# Patient Record
(demographics unavailable — no encounter records)

---

## 2024-10-29 NOTE — DISCUSSION/SUMMARY
[FreeTextEntry1] : 76 YO F s/p R PE & embolectomy (10/14/2024) with RLE DVT on Eliquis 5 mg BID who has multiple medical conditions and is obese with a sedentary lifestyle. Pt has cough with wheezing which is worse at night and uses albuterol inhaler daily. Pt will do a trial of Trelegy and FU in 1 month.  Attending  Patient admitted to the hospital for chest pain and shortness of breath and found to have pulmonary emboli.  Likely secondary to patient's morbid obesity and inactivity.  Since this is likely to be lifelong I recommend lifelong anticoagulation.  Patient has a cough with wheezing and she uses albuterol every night.  She can also feel symptoms during the day.  I favor trial of Trelegy 100 mg 1 spray daily.  We will see the patient in 1 month with pulmonary function test. The patient understands and agrees with plan of care. Today's office visit encompassed 62 minutes. I conducted an extensive history,physical exam and reviewed diagnosis and treatment options including diagnostic tests,radiology studies including cat scans and the use of prescription medication.

## 2024-10-29 NOTE — PHYSICAL EXAM
[No Acute Distress] : no acute distress [Normal Oropharynx] : normal oropharynx [Normal Appearance] : normal appearance [No Neck Mass] : no neck mass [Normal Rate/Rhythm] : normal rate/rhythm [Normal S1, S2] : normal s1, s2 [No Murmurs] : no murmurs [No Resp Distress] : no resp distress [Clear to Auscultation Bilaterally] : clear to auscultation bilaterally [No Abnormalities] : no abnormalities [Benign] : benign [No Clubbing] : no clubbing [No Cyanosis] : no cyanosis [FROM] : FROM [1+ Pitting] : 1+ pitting [Normal Color/ Pigmentation] : normal color/ pigmentation [No Focal Deficits] : no focal deficits [Oriented x3] : oriented x3 [Normal Affect] : normal affect [TextBox_99] : uses sit/walker, +kyphosis

## 2024-10-29 NOTE — HISTORY OF PRESENT ILLNESS
[Never] : never [TextBox_4] : 76 yo F no documented Pulmonary conditions but does have wheezing so has used albuterol inhaler prn through the years  Has lived in [place] for 13 years and most recent with daughter in [place]. Lives with a dog & bird. No Hx of sleep apnea but snores & does have day somnolence.  Has cough at night and doesn't have cough meds. Uses albuterol if feels SOB prior to bed. Last used last night.  Pt was admitted to [facility] from 10/12/2024 - 10/22/2024 fpor PE & DVT.  CTA showed Massive PE on R side with right heart strain.    S/p IR-guided PE thrombectomy 10/14 Pt saw Dr. Westfall ([facility]) today and will continue with the [facility].  Sees Cardio, Endo & Nephro - seeing all this week.  Today feels tired, fatigued. Pt does regularly have assistance from family with ADLs, this has not changed.  Overall feels breathing better than before hospital .

## 2024-11-07 NOTE — HISTORY OF PRESENT ILLNESS
[FreeTextEntry1] : 76yo F here with daughter for left renal mass. History obtained from the daughter as she is Polish speaking only.  Patient was recently hospitalized for 2 months at [facility] for a massive PE and underwent thrombectomy on 10/14. She is on Eliquis now. Also has multiple other comorbid medical conditions including CKD for which she is on lasix 40mg daily. She reports that causes her to urinate frequently and has some stress incontinence that is bothersome. She is also very tired from seeing so many doctors, walks with a walker. Has a heart monitor on and going for a stress test soon.  While in the hospital she had contrast nephropathy. Most recent creatinine dennis was 1.2.  While she was at [facility] she had an ultrasound done that showed a 3.2cm left lower pole renal mass. She was unaware of this finding.   Left kidney: 12.1 cm.Increased renal cortical echogenicity. Irregularity of the cortex in the left midpolar kidney. There is a solid, hypoechoic mass in the left kidney lower pole with vascularity measuring 3.2 x 2.7 x 2.9cm. It measured 2.9 cm x 3.1 cm x 2.6 cm on prior CT 11/1/2023.  Prior CT scan from 11/2023 showed similar size and appearance of the left renal mass.   Denies family history of  malignancies.  No smoking history.  Prior surgery: hysterectomy, thyroid surgery

## 2024-11-07 NOTE — ASSESSMENT
[FreeTextEntry1] : 76yo comorbid female with 3cm left renal mass, urinary incontinence  Left renal mass - stable in size from imaging 1 year ago but not ideal to compare ultrasound to CT scan. However given recent contrast nephropathy and CKD will refrain from giving IV iodinated contrast load - will obtain a MRI with and without gadolinium for better characterization of the mass - discussed that the mass is small in size and stable and can likely be followed with routine imaging vs other interventions. Will further discuss at next appointment  Urinary incontinence - discussed options for UI are limited given her need for the furosemide and her weight  Follow-up appointment in 1 month after MRI for continued management of left renal mass

## 2024-11-07 NOTE — PHYSICAL EXAM
[Normal Appearance] : normal appearance [Well Groomed] : well groomed [General Appearance - In No Acute Distress] : no acute distress [Respiration, Rhythm And Depth] : normal respiratory rhythm and effort [Exaggerated Use Of Accessory Muscles For Inspiration] : no accessory muscle use [Abdomen Soft] : soft [Abdomen Tenderness] : non-tender [Costovertebral Angle Tenderness] : no ~M costovertebral angle tenderness [Urinary Bladder Findings] : the bladder was normal on palpation [Normal Station and Gait] : the gait and station were normal for the patient's age [] : no rash [No Focal Deficits] : no focal deficits [Oriented To Time, Place, And Person] : oriented to person, place, and time [Mood] : the mood was normal [Affect] : the affect was normal [No Palpable Adenopathy] : no palpable adenopathy [FreeTextEntry1] : obesity [de-identified] : + significant lower extremity edema

## 2024-11-18 NOTE — PHYSICAL EXAM
[Walker] : ambulates with walker [de-identified] :  Left knee exam shows no effusion, ROM is 0-110 degrees, no instability, no pain with Niles, pain medial and lateral joint line tenderness. Right knee exam shows no effusion, ROM is 0-115 degrees, no instability, no pain with Niles, medial and lateral joint line tenderness. 5/5 motor strength in bilateral lower extremities. Sensory: Intact in bilateral lower extremities. DTRs: Biceps, brachioradialis, triceps, patellar, ankle and plantar 2+ and symmetric bilaterally. Pulses: dorsalis pedis, posterior tibial, femoral, popliteal, and radial 2+ and symmetric bilaterally.

## 2024-11-18 NOTE — PHYSICAL EXAM
[Walker] : ambulates with walker [de-identified] :  Left knee exam shows no effusion, ROM is 0-110 degrees, no instability, no pain with Niles, pain medial and lateral joint line tenderness. Right knee exam shows no effusion, ROM is 0-115 degrees, no instability, no pain with Niles, medial and lateral joint line tenderness. 5/5 motor strength in bilateral lower extremities. Sensory: Intact in bilateral lower extremities. DTRs: Biceps, brachioradialis, triceps, patellar, ankle and plantar 2+ and symmetric bilaterally. Pulses: dorsalis pedis, posterior tibial, femoral, popliteal, and radial 2+ and symmetric bilaterally.

## 2024-11-18 NOTE — HISTORY OF PRESENT ILLNESS
[de-identified] : 77 year old female presents to the office for follow up of bilateral knee pain, patient reports right worse than left. She received a cortisone injection at her last visit in August, which provided excellent relief of symptoms. States the pain has gradually begun to return.  Exacerbated by walking, standing, rising from a seated position. Ambulating with the use of a walker. States that her knee hilda often. Denies any falls/trauma, numbness/tingling. The patient states that she was hospitalized last month for a PE and is now on Eliquis.   Of note the patient is accompanied by her family member who is providing translation services.

## 2024-11-18 NOTE — HISTORY OF PRESENT ILLNESS
[de-identified] : 77 year old female presents to the office for follow up of bilateral knee pain, patient reports right worse than left. She received a cortisone injection at her last visit in August, which provided excellent relief of symptoms. States the pain has gradually begun to return.  Exacerbated by walking, standing, rising from a seated position. Ambulating with the use of a walker. States that her knee hilda often. Denies any falls/trauma, numbness/tingling. The patient states that she was hospitalized last month for a PE and is now on Eliquis.   Of note the patient is accompanied by her family member who is providing translation services.

## 2024-11-18 NOTE — DISCUSSION/SUMMARY
[Medication Risks Reviewed] : Medication risks reviewed [Surgical risks reviewed] : Surgical risks reviewed [de-identified] : 77-year-old female presents to the office for follow-up of severe bilateral knee osteoarthritis, reports right worse than left. The patient would like to defer any surgery in her knees for as long as possible. She is still awaiting a procedure for her back, this was delayed to a recent hospitalization for PE.  She would therefore like to continue with conservative treatment. I have given her injection of cortisone to bilateral knees, which she tolerated well. I recommend low impact active and exercise. I recommend physical therapy. She should take Tylenol as needed for pain as she is unable to take NSAIDs due to her use of Eliquis. She should follow-up in 3 months repeat evaluation. All questions addressed with the patient and her family member who provided translation, patient in agreement.

## 2024-11-18 NOTE — DISCUSSION/SUMMARY
[Medication Risks Reviewed] : Medication risks reviewed [Surgical risks reviewed] : Surgical risks reviewed [de-identified] : 77-year-old female presents to the office for follow-up of severe bilateral knee osteoarthritis, reports right worse than left. The patient would like to defer any surgery in her knees for as long as possible. She is still awaiting a procedure for her back, this was delayed to a recent hospitalization for PE.  She would therefore like to continue with conservative treatment. I have given her injection of cortisone to bilateral knees, which she tolerated well. I recommend low impact active and exercise. I recommend physical therapy. She should take Tylenol as needed for pain as she is unable to take NSAIDs due to her use of Eliquis. She should follow-up in 3 months repeat evaluation. All questions addressed with the patient and her family member who provided translation, patient in agreement.

## 2024-11-18 NOTE — REVIEW OF SYSTEMS
[Joint Pain] : joint pain [Joint Stiffness] : joint stiffness [Joint Swelling] : joint swelling [Lower Ext Edema] : lower extremity edema [SOB on Exertion] : shortness of breath on exertion [Negative] : Heme/Lymph [FreeTextEntry9] : bilateral knee

## 2024-12-02 NOTE — REASON FOR VISIT
[Asthma] : asthma [Pulmonary Embolism] : pulmonary embolism [Other: _____] : [unfilled] [TextEntry] : Patient has been feeling better.  She has been using the Trelegy.

## 2024-12-02 NOTE — HISTORY OF PRESENT ILLNESS
[Never] : never [TextBox_4] : 77 female hx PE/DVT last visit on trelegy 1 qd feels very good no sob no cough no wheeze no chest pain no tightness full activity  prior tika normal

## 2024-12-02 NOTE — DISCUSSION/SUMMARY
[FreeTextEntry1] : Ms. Velázquez has mild asthma.  She is doing well on the Trelegy.  We will continue same.  The patient had a prior pulmonary embolus deep venous thrombosis.  Likely secondary to obesity and sedentary lifestyle.  I recommend continuing lifetime anticoagulation patient and daughter agree. The patient understands and agrees with plan of care. Today's office visit encompassed 32 minutes which excludes teaching and separately reported services.. I conducted an extensive history, physical exam and reviewed diagnosis and treatment options including diagnostic tests,radiology studies including cat scans and the use of prescription medication.

## 2024-12-16 NOTE — PHYSICAL EXAM
[General Appearance - Alert] : alert [General Appearance - In No Acute Distress] : in no acute distress [Ankle Swelling (On Exam)] : present [Varicose Veins Of Lower Extremities] : present [1+] : left foot posterior tibialis 1+ [2+] : left foot dorsalis pedis 2+ [FreeTextEntry1] : bilateral peripheral edema L>R +2 pitting Left  [de-identified] : Large bunion deformity with cross over deformity to toe bilaterally there is severe bunion deformity overlapping 2nd toe over the first L>R. There is no evidence of callus lesions or ulcerations. there is large dorsal medial emience L>R. 1st TMTJ hypermobility noted noted. there is localized pain at the eminence. 1st MTPJ ROM approximately 40 degrees without pain on range of motion   She is utilizing a walker  [] : no rash [Skin Lesions] : no skin lesions [Vibration Dec.] : diminished vibratory sensation at the level of the toes [FreeTextEntry4] : severe decreased vibratory sensation bilateral  [Impaired Insight] : insight and judgment were intact [Oriented To Time, Place, And Person] : oriented to person, place, and time

## 2024-12-16 NOTE — HISTORY OF PRESENT ILLNESS
[FreeTextEntry1] : 77-year-old patient is in for Left foot pain and diabetic foot evaluation. States side of her left foot is bothering her. Admits numbness and tingling to feet. Denies any history of foot ulceration. Denies any symptoms of claudication  pts last a1c was: 8.6 glucose: 138

## 2024-12-16 NOTE — ASSESSMENT
[FreeTextEntry1] :  I then counseled the patient on performing self-examination of the feet on a daily basis. I explained the importance of this due to the diminished sensation and the greater susceptibility of getting an infection and having additional complications due to the medical condition that exists, especially if they lack protective sensation on their feet. I advised the patient to notify the office right away if increased redness, swelling, pain, open wounds or discharge were observed. I explained the importance of checking the glucose regularly and of keeping the glucose level between 90 -110 and more importantly controlling the HbA1C keeping consistent and trying to achieve as close to normal and HbA1C as possible around 6.0. I told the patient to notify the MD if the glucose level changed to above or below those levels. Advised to check feet daily and do not walk barefoot  Discussed diagnosis and treatment with patient  Discussed etiology of symptoms patient is experiencing  Obtained and reviewed RLeft foot xrays WB 3 views: increased IM angle, increased hallux abductus angle, increased tibial sesamoid position  Educated on changing into accommodative shoegear with wider toe-box and refrain from wearing narrow-tipped shoes  Advised use of silicone bunion toe sleeves Discussed NSAID/Injection therapy but may give temporary relief for flare up of bursitis  Discussed all non-surgical and surgical treatment with patient. Needs to improve a1c before considering surgery   peripheral edema advised compression stockings low salt diet consider vascular eval  Follow up 6 month high risk diabetic care or as needed for bunion treatment

## 2024-12-16 NOTE — PHYSICAL EXAM
[General Appearance - Alert] : alert [General Appearance - In No Acute Distress] : in no acute distress [Ankle Swelling (On Exam)] : present [Varicose Veins Of Lower Extremities] : present [1+] : left foot posterior tibialis 1+ [2+] : left foot dorsalis pedis 2+ [FreeTextEntry1] : bilateral peripheral edema L>R +2 pitting Left  [de-identified] : Large bunion deformity with cross over deformity to toe bilaterally there is severe bunion deformity overlapping 2nd toe over the first L>R. There is no evidence of callus lesions or ulcerations. there is large dorsal medial emience L>R. 1st TMTJ hypermobility noted noted. there is localized pain at the eminence. 1st MTPJ ROM approximately 40 degrees without pain on range of motion   She is utilizing a walker  [] : no rash [Skin Lesions] : no skin lesions [Vibration Dec.] : diminished vibratory sensation at the level of the toes [FreeTextEntry4] : severe decreased vibratory sensation bilateral  [Oriented To Time, Place, And Person] : oriented to person, place, and time [Impaired Insight] : insight and judgment were intact

## 2025-01-15 NOTE — HISTORY OF PRESENT ILLNESS
[FreeTextEntry1] : 78yo F here with daughter for left renal mass. History obtained from the daughter as she is Indonesian speaking only.  Patient was recently hospitalized for 2 months at [facility] for a massive PE and underwent thrombectomy on 10/14. She is on Eliquis now. Also has multiple other comorbid medical conditions including CKD for which she is on lasix 40mg daily. She reports that causes her to urinate frequently and has some stress incontinence that is bothersome. She is also very tired from seeing so many doctors, walks with a walker. Has a heart monitor on and going for a stress test soon.  While in the hospital she had contrast nephropathy. Most recent creatinine dennis was 1.2.  While she was at [facility] she had an ultrasound done that showed a 3.2cm left lower pole renal mass. She was unaware of this finding.   Left kidney: 12.1 cm.Increased renal cortical echogenicity. Irregularity of the cortex in the left midpolar kidney. There is a solid, hypoechoic mass in the left kidney lower pole with vascularity measuring 3.2 x 2.7 x 2.9cm. It measured 2.9 cm x 3.1 cm x 2.6 cm on prior CT 11/1/2023.  Prior CT scan from 11/2023 showed similar size and appearance of the left renal mass.   Denies family history of  malignancies.  No smoking history.  Prior surgery: hysterectomy, thyroid surgery  1/15/2025: Patient returns for followup. Daughter is here to help translate. Had MRI completed. She feels much better than last visit.  MRI confirmed 2.9cm left lower pole renal mass, stable in size compared to CT scan from 11/2023.

## 2025-01-15 NOTE — ASSESSMENT
[FreeTextEntry1] : Left renal mass - stable in size from imaging 11/2023  - We discussed the nature of small renal mass and the low risk of metastasis - mass has been stable for at least 14 months - We briefly discussed intervention options including surgical and IR cryoablation. She would be very high risk for any procedure given her history of massive PE on Eliquis.  - discussed that the mass is small in size and stable and can likely be followed with routine imaging on active surveillence protocol. Patient would like to proceed with surveillance.   Follow-up appointment in 6 months after MRI for continued management of left renal mass.

## 2025-01-15 NOTE — PHYSICAL EXAM
[Normal Appearance] : normal appearance [Well Groomed] : well groomed [General Appearance - In No Acute Distress] : no acute distress [Respiration, Rhythm And Depth] : normal respiratory rhythm and effort [Exaggerated Use Of Accessory Muscles For Inspiration] : no accessory muscle use [Abdomen Soft] : soft [Abdomen Tenderness] : non-tender [Costovertebral Angle Tenderness] : no ~M costovertebral angle tenderness [Urinary Bladder Findings] : the bladder was normal on palpation [Normal Station and Gait] : the gait and station were normal for the patient's age [] : no rash [No Focal Deficits] : no focal deficits [Oriented To Time, Place, And Person] : oriented to person, place, and time [Affect] : the affect was normal [Mood] : the mood was normal [No Palpable Adenopathy] : no palpable adenopathy [FreeTextEntry1] : obesity [de-identified] : + significant lower extremity edema

## 2025-02-24 NOTE — DISCUSSION/SUMMARY
[Medication Risks Reviewed] : Medication risks reviewed [Surgical risks reviewed] : Surgical risks reviewed [de-identified] : 77-year-old female presents to the office for follow-up of severe bilateral knee osteoarthritis, reports right worse than left. The patient is not interested in surgical intervention at this time. Patient is still awaiting a procedure for her back, this was delayed to hospitalization for DVT/PE in October 2024.  She would therefore like to continue with conservative treatment for her knees. I have given her injection of cortisone to bilateral knees, which she tolerated well. I recommend low impact active and exercise. I recommend physical therapy. She should take Tylenol as needed for pain as she is unable to take NSAIDs due to her use of Eliquis. She should follow-up in 3 months repeat evaluation. All questions addressed with the patient and her family member who provided translation, patient in agreement.

## 2025-02-24 NOTE — HISTORY OF PRESENT ILLNESS
[de-identified] : 77 year old female presents to the office for follow up severe b/l knee OA, patient reports right than left.  She received a cortisone injection at her last visit in November, which provided excellent relief of symptoms. States the pain has gradually begun to return. Exacerbated by walking, standing, rising from a seated position. Ambulating with the use of a walker. States that her knee hilda often, which is limiting the amount she can ambulate on a day to day basis. Denies any falls/trauma, numbness/tingling. The patient is currently on Eliquis and unable to take NSAIDs due to a DVT/PE from October 2024.   Of note the patient is accompanied by her family member who is providing translation.

## 2025-02-24 NOTE — PHYSICAL EXAM
[de-identified] : Left knee exam shows no effusion, ROM is 0-110 degrees, no instability, no pain with Niles, pain medial and lateral joint line tenderness. Right knee exam shows no effusion, ROM is 0-115 degrees, no instability, no pain with Niles, medial and lateral joint line tenderness. 5/5 motor strength in bilateral lower extremities. Sensory: Intact in bilateral lower extremities. DTRs: Biceps, brachioradialis, triceps, patellar, ankle and plantar 2+ and symmetric bilaterally. Pulses: dorsalis pedis, posterior tibial, femoral, popliteal, and radial 2+ and symmetric bilaterally.

## 2025-02-24 NOTE — PROCEDURE
[de-identified] : I injected the right knee. I discussed at length with the patient the planned steroid and lidocaine injection. The risks, benefits, convalescence and alternatives were reviewed. The possible side effects discussed included but were not limited to: pain, swelling, heat, bleeding, and redness. Symptoms are generally mild but if they are extensive then contact the office. Giving pain relievers by mouth such as NSAIDs or Tylenol can generally treat the reactions to steroid and lidocaine. Rare cases of infection have been noted. Rash, hives and itching may occur post injection. If you have muscle pain or cramps, flushing and or swelling of the face, rapid heart beat, nausea, dizziness, fever, chills, headache, difficulty breathing, swelling in the arms or legs, or have a prickly feeling of your skin, contact a health care provider immediately. Following this discussion, the knee was prepped with Alcohol and under sterile condition the 80 mg Depo-Medrol and 6 cc Lidocaine injection was performed with a 20 gauge needle through a superolateral injection site. The needle was introduced into the joint, aspiration was performed to ensure intra-articular placement and the medication was injected. Upon withdrawal of the needle the site was cleaned with alcohol and a band aid applied. The patient tolerated the injection well and there were no adverse effects. Post injection instructions included no strenuous activity for 24 hours, cryotherapy and if there are any adverse effects to contact the office.  I injected the left knee. I discussed at length with the patient the planned steroid and lidocaine injection. The risks, benefits, convalescence and alternatives were reviewed. The possible side effects discussed included but were not limited to: pain, swelling, heat, bleeding, and redness. Symptoms are generally mild but if they are extensive then contact the office. Giving pain relievers by mouth such as NSAIDs or Tylenol can generally treat the reactions to steroid and lidocaine. Rare cases of infection have been noted. Rash, hives and itching may occur post injection. If you have muscle pain or cramps, flushing and or swelling of the face, rapid heart beat, nausea, dizziness, fever, chills, headache, difficulty breathing, swelling in the arms or legs, or have a prickly feeling of your skin, contact a health care provider immediately. Following this discussion, the knee was prepped with Alcohol and under sterile condition the 80 mg Depo-Medrol and 6 cc Lidocaine injection was performed with a 20 gauge needle through a superolateral injection site. The needle was introduced into the joint, aspiration was performed to ensure intra-articular placement and the medication was injected. Upon withdrawal of the needle the site was cleaned with alcohol and a band aid applied. The patient tolerated the injection well and there were no adverse effects. Post injection instructions included no strenuous activity for 24 hours, cryotherapy and if there are any adverse effects to contact the office.

## 2025-03-10 NOTE — ASSESSMENT
[FreeTextEntry1] : Ms Rachael Velázquez is a Serbian speaking Female with long standing history of HTN, morbidly obese, DMT2, Gout, h/o recurrent UTI (ESBL UTI), Hypothyroidism(s/p partial R thyroidectomy), Severe osteoarthritis, Left Renal Mass and CKD stage 3 is here to establish care.  # Chronic Kidney Disease Stage 3 Etiology/risk factor Hypertensive nephrosclerosis, DM  Check labs today   # Proteinuria: will assess  # Hypertension: BP at goal  Currently on Clonidin e0.2 mg BID, Valsartan-HCTZ 320-25 mg daily, Metoprolol  Cont current regimen  # Diabetes Mellitus Type 2 sees Endocrine Reports blood glucose has been out of control, working on  Reports neuropathy, no retinopathy   # Recent massive PE 2/2 DVT: in OCt 2024- on Eliquis, following hematology   # Let Renal mass: stable but has vascularity and is suspicious for RCC She is following Urology, has a 6 month follow up  # Fluid status:  euvolemic recently had lower extremity edema, was on Lasix 40 mg prn, has not taken for a few months now Target weight 235 lb  # Acid Base: will check, will continue to monitor # Electrolytes:  will check , will continue to monitor # CKD BMD: will check Ca Phos iPTH Vit D, , will continue to monitor  Labs today  RTC in 3 months with preclinic labs

## 2025-03-10 NOTE — PHYSICAL EXAM
[General Appearance - Alert] : alert [General Appearance - In No Acute Distress] : in no acute distress [Sclera] : the sclera and conjunctiva were normal [Extraocular Movements] : extraocular movements were intact [PERRL With Normal Accommodation] : pupils were equal in size, round, and reactive to light [Outer Ear] : the ears and nose were normal in appearance [Oropharynx] : the oropharynx was normal [Neck Appearance] : the appearance of the neck was normal [Jugular Venous Distention Increased] : there was no jugular-venous distention [Auscultation Breath Sounds / Voice Sounds] : lungs were clear to auscultation bilaterally [Heart Rate And Rhythm] : heart rate was normal and rhythm regular [Heart Sounds] : normal S1 and S2 [Heart Sounds Gallop] : no gallops [Murmurs] : no murmurs [Heart Sounds Pericardial Friction Rub] : no pericardial rub [Full Pulse] : the pedal pulses are present [Edema] : there was no peripheral edema [Bowel Sounds] : normal bowel sounds [Abdomen Soft] : soft [Abdomen Tenderness] : non-tender [Abdomen Mass (___ Cm)] : no abdominal mass palpated [Skin Turgor] : normal skin turgor [Skin Color & Pigmentation] : normal skin color and pigmentation [] : no rash [No Focal Deficits] : no focal deficits [Oriented To Time, Place, And Person] : oriented to person, place, and time [Impaired Insight] : insight and judgment were intact [Affect] : the affect was normal [FreeTextEntry1] : on wheelcharir, able to stand with assisstance

## 2025-03-10 NOTE — ADDENDUM
[FreeTextEntry1] : Labs reviewed: SCr 1.47, BUN 42, eGFR 37;  UPCR 0.2 CKD BMD:  within range  no change in plan

## 2025-03-10 NOTE — ASSESSMENT
[FreeTextEntry1] : Ms Rachael Velázquez is a Tongan speaking Female with long standing history of HTN, morbidly obese, DMT2, Gout, h/o recurrent UTI (ESBL UTI), Hypothyroidism(s/p partial R thyroidectomy), Severe osteoarthritis, Left Renal Mass and CKD stage 3 is here to establish care.  # Chronic Kidney Disease Stage 3 Etiology/risk factor Hypertensive nephrosclerosis, DM  Check labs today   # Proteinuria: will assess  # Hypertension: BP at goal  Currently on Clonidin e0.2 mg BID, Valsartan-HCTZ 320-25 mg daily, Metoprolol  Cont current regimen  # Diabetes Mellitus Type 2 sees Endocrine Reports blood glucose has been out of control, working on  Reports neuropathy, no retinopathy   # Recent massive PE 2/2 DVT: in OCt 2024- on Eliquis, following hematology   # Let Renal mass: stable but has vascularity and is suspicious for RCC She is following Urology, has a 6 month follow up  # Fluid status:  euvolemic recently had lower extremity edema, was on Lasix 40 mg prn, has not taken for a few months now Target weight 235 lb  # Acid Base: will check, will continue to monitor # Electrolytes:  will check , will continue to monitor # CKD BMD: will check Ca Phos iPTH Vit D, , will continue to monitor  Labs today  RTC in 3 months with preclinic labs

## 2025-03-10 NOTE — PHYSICAL EXAM
[General Appearance - Alert] : alert [General Appearance - In No Acute Distress] : in no acute distress [Sclera] : the sclera and conjunctiva were normal [PERRL With Normal Accommodation] : pupils were equal in size, round, and reactive to light [Extraocular Movements] : extraocular movements were intact [Outer Ear] : the ears and nose were normal in appearance [Oropharynx] : the oropharynx was normal [Neck Appearance] : the appearance of the neck was normal [Jugular Venous Distention Increased] : there was no jugular-venous distention [Auscultation Breath Sounds / Voice Sounds] : lungs were clear to auscultation bilaterally [Heart Rate And Rhythm] : heart rate was normal and rhythm regular [Heart Sounds] : normal S1 and S2 [Heart Sounds Gallop] : no gallops [Murmurs] : no murmurs [Heart Sounds Pericardial Friction Rub] : no pericardial rub [Full Pulse] : the pedal pulses are present [Edema] : there was no peripheral edema [Bowel Sounds] : normal bowel sounds [Abdomen Soft] : soft [Abdomen Tenderness] : non-tender [Abdomen Mass (___ Cm)] : no abdominal mass palpated [Skin Color & Pigmentation] : normal skin color and pigmentation [Skin Turgor] : normal skin turgor [] : no rash [No Focal Deficits] : no focal deficits [Oriented To Time, Place, And Person] : oriented to person, place, and time [Impaired Insight] : insight and judgment were intact [Affect] : the affect was normal [FreeTextEntry1] : on wheelcharir, able to stand with assisstance

## 2025-03-10 NOTE — HISTORY OF PRESENT ILLNESS
[Stage 3] : stage 3 [Diabetic Nephropathy] : diabetic nephropathy [Hypertensive Nephropathy] : hypertensive nephropathy [FreeTextEntry1] :  Ms Rachael Velázquez is a Namibian speaking Female with long standing history of  HTN, morbidly obese, DMT2, Gout, h/o recurrent UTI (ESBL UTI), Hypothyroidism(s/p partial R thyroidectomy), Severe osteoarthritis< Left Renal Mass and CKD stage 3 is here to establish care.   She was recently hospitalized for 2 months at [facility] for a massive PE and underwent thrombectomy on 10/14/2024. She is on Eliquis now. She developed JANIA during hospitalization Cr was 1.5, improved to dennis 1.1, peaked to 1.7, improved to 1.2. She dhad developed Contrast associated JANIA.   HTN : for atleast 35 year, prior poor control but now blood pressure are usually well controlled at doctors office, occasionally check BP at home and is 110-120/80  range DM Type 2 : diagnosed around 3 years ago, glycemic control has been poor. She is now trying to get better control  OA knee and h ip: previously was on Meloxicam, but has stopped at NSAIDs Gout: previously had several gout attack and was on allopurinol. She states now she has not  had gout for many year and been off allopurinol.   She previously saw Nephrologist in [place] but that is too far for her so is seeking care locally, thus decided to change nephrologist.   Daughter is here today and helped in translation.

## 2025-03-10 NOTE — HISTORY OF PRESENT ILLNESS
[Stage 3] : stage 3 [Diabetic Nephropathy] : diabetic nephropathy [Hypertensive Nephropathy] : hypertensive nephropathy [FreeTextEntry1] :  Ms Rachael Velázquez is a Central African speaking Female with long standing history of  HTN, morbidly obese, DMT2, Gout, h/o recurrent UTI (ESBL UTI), Hypothyroidism(s/p partial R thyroidectomy), Severe osteoarthritis< Left Renal Mass and CKD stage 3 is here to establish care.   She was recently hospitalized for 2 months at [facility] for a massive PE and underwent thrombectomy on 10/14/2024. She is on Eliquis now. She developed JANIA during hospitalization Cr was 1.5, improved to dennis 1.1, peaked to 1.7, improved to 1.2. She dhad developed Contrast associated JANIA.   HTN : for atleast 35 year, prior poor control but now blood pressure are usually well controlled at doctors office, occasionally check BP at home and is 110-120/80  range DM Type 2 : diagnosed around 3 years ago, glycemic control has been poor. She is now trying to get better control  OA knee and h ip: previously was on Meloxicam, but has stopped at NSAIDs Gout: previously had several gout attack and was on allopurinol. She states now she has not  had gout for many year and been off allopurinol.   She previously saw Nephrologist in [place] but that is too far for her so is seeking care locally, thus decided to change nephrologist.   Daughter is here today and helped in translation.

## 2025-06-02 NOTE — REASON FOR VISIT
[Follow-Up] : a follow-up visit [Asthma] : asthma [Cough] : cough [Family Member] : family member [TextEntry] : Patient was not feeling well about 20 days ago and was treated at Primary care with antibiotics and nebulizer meds.  She had a cough, difficulty breathing, and wheezing.  She is feeling better and there has been an improvement with her cough, and SOB.

## 2025-06-02 NOTE — DISCUSSION/SUMMARY
[FreeTextEntry1] : Ms. Velázquez seems to have mild asthma.  She is well-controlled with the Trelegy 100 mg 1 spray daily.  Her chest x-ray is clear.  I think the majority of her shortness of breath is her morbid obesity.  She should talk discussed various treatments including the current injections with her primary care physician.  Will follow-up with PFTs in 6 months. The patient understands and agrees with plan of care. Today's office visit encompassed 32 minutes which excludes teaching and separately reported services.. I conducted an extensive history, physical exam and reviewed diagnosis and treatment options including diagnostic tests,radiology studies including cat scans and the use of prescription medication.

## 2025-06-02 NOTE — HISTORY OF PRESENT ILLNESS
[Never] : never [TextBox_4] : 77 female hx mild asthma well maintained on trelegy 100 1 qd today  co 25 d ago severe cough and hyeprglycemia and sob  and chest pain and cxr normal seen by cardiol bec of coronary calcuim score  today feels good  +phlegm white sl yellow no blood  no chest pain  minimal activity anibal in hot weather and pt getting PT  andria trelegy 1 qpm

## 2025-07-14 NOTE — PHYSICAL EXAM
[Chaperoned Physical Exam] : A chaperone was present in the examining room during all aspects of the physical examination. [Appropriately responsive] : appropriately responsive [Alert] : alert [No Acute Distress] : no acute distress [Soft] : soft [Non-tender] : non-tender [Non-distended] : non-distended [Oriented x3] : oriented x3 [Examination Of The Breasts] : a normal appearance [No Discharge] : no discharge [No Masses] : no breast masses were palpable [Normal] : normal external genitalia [Labia Majora] : normal [Labia Minora] : normal

## 2025-07-16 NOTE — ASSESSMENT
[FreeTextEntry1] : Left renal mass - stable in size from imaging 11/2023, repeat MRI done 7/2025 mass stable 2.9 cm - We discussed the nature of small renal mass and the low risk of metastasis - mass has been stable for at least 20 months - Discussed options and given the patient's multiple comorbidities and stable size of the mass we will continue with active surveillance - Repeat imaging in 6 months with a renal ultrasound, if there is any suspicion for increase in the size of the mass we will get cross-sectional imaging  Follow-up appointment in 6 months for continued management of left renal mass

## 2025-07-16 NOTE — HISTORY OF PRESENT ILLNESS
[FreeTextEntry1] : 78yo F here with daughter for left renal mass. History obtained from the daughter as she is Belizean speaking only.  Patient was recently hospitalized for 2 months at [facility] for a massive PE and underwent thrombectomy on 10/14. She is on Eliquis now. Also has multiple other comorbid medical conditions including CKD for which she is on lasix 40mg daily. She reports that causes her to urinate frequently and has some stress incontinence that is bothersome. She is also very tired from seeing so many doctors, walks with a walker. Has a heart monitor on and going for a stress test soon.  While in the hospital she had contrast nephropathy. Most recent creatinine dennis was 1.2.  While she was at [facility] she had an ultrasound done that showed a 3.2cm left lower pole renal mass. She was unaware of this finding.   Left kidney: 12.1 cm.Increased renal cortical echogenicity. Irregularity of the cortex in the left midpolar kidney. There is a solid, hypoechoic mass in the left kidney lower pole with vascularity measuring 3.2 x 2.7 x 2.9cm. It measured 2.9 cm x 3.1 cm x 2.6 cm on prior CT 11/1/2023.  Prior CT scan from 11/2023 showed similar size and appearance of the left renal mass.   Denies family history of  malignancies.  No smoking history.  Prior surgery: hysterectomy, thyroid surgery  1/15/2025: Patient returns for followup. Daughter is here to help translate. Had MRI completed. She feels much better than last visit.  MRI confirmed 2.9cm left lower pole renal mass, stable in size compared to CT scan from 11/2023.   07/16/2025: Patient here with daughter who helped translate.  She had a repeat MRI done that showed the renal masses stable at 2.9 cm.  Denies any new complaints.  She did have significant claustrophobia with the MRI.

## 2025-07-16 NOTE — PHYSICAL EXAM
[Normal Appearance] : normal appearance [Well Groomed] : well groomed [General Appearance - In No Acute Distress] : no acute distress [Respiration, Rhythm And Depth] : normal respiratory rhythm and effort [Exaggerated Use Of Accessory Muscles For Inspiration] : no accessory muscle use [Abdomen Soft] : soft [Abdomen Tenderness] : non-tender [Costovertebral Angle Tenderness] : no ~M costovertebral angle tenderness [Urinary Bladder Findings] : the bladder was normal on palpation [Normal Station and Gait] : the gait and station were normal for the patient's age [] : no rash [No Focal Deficits] : no focal deficits [Oriented To Time, Place, And Person] : oriented to person, place, and time [Affect] : the affect was normal [Mood] : the mood was normal [No Palpable Adenopathy] : no palpable adenopathy [FreeTextEntry1] : obesity [de-identified] : + significant lower extremity edema

## 2025-07-18 NOTE — REVIEW OF SYSTEMS
[Negative] : Breast [Urgency] : urgency [Incontinence] : incontinence [FreeTextEntry8] : Urge incontinence.

## 2025-07-18 NOTE — PLAN
[FreeTextEntry1] : For her urge incontinence symptoms, will try estrace cream at the urethra and a little inside the vagina. She will use the cream every night for 2 weeks, then twice a week. She knows that it can take up 3 months before she notices an improvement. If this does not help her symptom in 3 months, will consult with urogynecology. Referral given.   Declines anti-cholinergic medications as she already has issues with constipation.   Up to date with colon cancer screening.   Pap not collected due to age and total hysterectomy.   Prescription renewal for Nystatin Powder given.   Prescription for mammogram screening and breast US given. Self-breast exam reviewed.  She will follow up annually and as needed.

## 2025-07-18 NOTE — HISTORY OF PRESENT ILLNESS
[Y] : Patient is sexually active [PapSmeardate] : 2017 [TextBox_31] : Patient states normal result  [ColonoscopyDate] : 2022 [TextBox_43] : Normal as per patient  [LMPDate] : 1990 [PGHxTotal] : 3 [[facility]] : 3 [[facility] Center[facility]] : 3 [FreeTextEntry1] : : 3x

## 2025-07-18 NOTE — END OF VISIT
[FreeTextEntry3] : I, Sascha Kelsey solely acted as scribe for Dr. Katie Martin on 07/14/2025  All medical entries made by the Scribe were at my, Dr. Arias, direction and personally dictated by me on 07/14/2025 . I have reviewed the chart and agree that the record accurately reflects my personal performance of the history, physical exam, assessment and plan. I have also personally directed, reviewed, and agreed with the chart.

## 2025-07-18 NOTE — HISTORY OF PRESENT ILLNESS
[Y] : Patient is sexually active [PapSmeardate] : 2017 [TextBox_31] : Patient states normal result  [ColonoscopyDate] : 2022 [TextBox_43] : Normal as per patient  [LMPDate] : 1990 [PGHxTotal] : 3 [[facility]] : 3 [[facility]] : 3 [FreeTextEntry1] : : 3x

## 2025-07-28 NOTE — ASSESSMENT
[FreeTextEntry1] : Ms Rachael Velázquez is a Icelandic speaking Female with long standing history of HTN, morbidly obese, DMT2, Gout, h/o recurrent UTI (ESBL UTI), Hypothyroidism(s/p partial R thyroidectomy), Severe osteoarthritis, Left Renal Mass and CKD stage 3 is here for follow-up  # Chronic Kidney Disease Stage 3 Etiology/risk factor Hypertensive nephrosclerosis, diabetic kidney disease Baseline creatinine around 1.5 mg/dL  # Subnephrotic range proteinuria: UPCR 0.2 g;  Continue valsartan and Farxiga 10 mg daily  # Hypertension: BP at goal  Currently on Clonidine 0.2 mg BID, Valsartan-HCTZ 320-25 mg daily, Metoprolol  Cont current regimen  # Diabetes Mellitus Type 2 sees Endocrine Reports blood glucose has been out of control, working on  Reports neuropathy, no retinopathy   # Recent massive PE 2/2 DVT: in Oct. 2024- on Eliquis, following hematology   # Let Renal mass: stable but has vascularity and is suspicious for RCC She is following Urology, has a 6 month follow up in January 2026  # Fluid status:  euvolemic recently had lower extremity edema, was on Lasix 20 mg prn, has not taken for a few months now Target weight 235 lb  # Acid Base: Within range, will continue to monitor # Electrolytes: Within range, will continue to monitor # CKD BMD: will check  RTC in 4months with preclinic labs

## 2025-07-28 NOTE — HISTORY OF PRESENT ILLNESS
[Stage 3] : stage 3 [Diabetic Nephropathy] : diabetic nephropathy [Hypertensive Nephropathy] : hypertensive nephropathy [FreeTextEntry1] : HPI:  Ms Rachael Velázquez is a Pakistani speaking Female with long standing history of  HTN, morbidly obese, DMT2, Gout, h/o recurrent UTI (ESBL UTI), Hypothyroidism(s/p partial R thyroidectomy), Severe osteoarthritis, Left Renal Mass and CKD stage 3 is here for follow up.  She was recently hospitalized for 2 months at [facility] for a massive PE and underwent thrombectomy on 10/14/2024. She is on Eliquis now. She developed JANIA during hospitalization Cr was 1.5, improved to dennis 1.1, peaked to 1.7, improved to 1.2. She had developed Contrast associated JANIA.  HTN : for atleast 35 year, prior poor control but now blood pressure are usually well controlled at doctors office, occasionally check BP at home and is 110-120/80  range DM Type 2 : diagnosed around 3 years ago, glycemic control has been poor. She is now trying to get better control  OA knee and hip: previously was on Meloxicam, but has stopped at NSAIDs Gout: previously had several gout attack and was on allopurinol. She states now she has not  had gout for many year and been off allopurinol.  She previously saw Nephrologist in [place] but that is too far for her so is seeking care locally, thus decided to change nephrologist.  Real US reviewed:  Left kidney: 12.1 cm.Increased renal cortical echogenicity. Irregularity of the cortex in the left midpolar kidney. There is a solid, hypoechoic mass in the left kidney lower pole with vascularity measuring 3.2 x 2.7 x 2.9cm. It measured 2.9 cm x 3.1 cm x 2.6 cm on prior CT 11/1/2023. Prior CT scan from 11/2023 showed similar size and appearance of the left renal mass.  Interval History:  Daughter is here today and helped in translation. She is doing well, no new complains.  Reports occasional lower extremity edema for which she takes Lasix 20 mg as needed. She had a routine follow-up with urology for a renal mass MRI confirmed 2.9cm left lower pole renal mass, stable in size compared to CT scan from 11/2023.